# Patient Record
Sex: MALE | Race: WHITE
[De-identification: names, ages, dates, MRNs, and addresses within clinical notes are randomized per-mention and may not be internally consistent; named-entity substitution may affect disease eponyms.]

---

## 2018-01-19 ENCOUNTER — HOSPITAL ENCOUNTER (EMERGENCY)
Dept: HOSPITAL 63 - ER | Age: 38
Discharge: HOME | End: 2018-01-19
Payer: OTHER GOVERNMENT

## 2018-01-19 VITALS — WEIGHT: 180 LBS | HEIGHT: 74 IN | BODY MASS INDEX: 23.1 KG/M2

## 2018-01-19 VITALS — DIASTOLIC BLOOD PRESSURE: 93 MMHG | SYSTOLIC BLOOD PRESSURE: 129 MMHG

## 2018-01-19 DIAGNOSIS — B34.9: ICD-10-CM

## 2018-01-19 DIAGNOSIS — J02.9: Primary | ICD-10-CM

## 2018-01-19 LAB
INFLUENZA A PATIENT: NEGATIVE
INFLUENZA B PATIENT: NEGATIVE

## 2018-01-19 PROCEDURE — 87880 STREP A ASSAY W/OPTIC: CPT

## 2018-01-19 PROCEDURE — 87070 CULTURE OTHR SPECIMN AEROBIC: CPT

## 2018-01-19 PROCEDURE — 87804 INFLUENZA ASSAY W/OPTIC: CPT

## 2018-01-19 PROCEDURE — 99284 EMERGENCY DEPT VISIT MOD MDM: CPT

## 2018-01-19 NOTE — ED.ADGEN
Past History


Past Medical History:  Other





Adult General


Chief Complaint


Chief Complaint


" ... I ve got the crud...everyone here  for robyn has the crud.. "





HPI


HPI





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 

complaints of myalgia, arthralgia, malaise, fever, chills, pharyngitis, and 

fatigue.  Patient recently arrived in [place] from [place] for 

training. 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 

symptoms. Patient is up-to-date with vaccinations. No history 

immunosuppression. He will follow-up [facility].





Review of Systems


Review of Systems





Constitutional: History fever or chills []


Eyes: Denies change in visual acuity, redness, or eye pain []


HENT: History nasal congestion and sore throat []


Respiratory: History cough. Denies shortness of breath []


Cardiovascular: No additional information not addressed in HPI []


GI: Denies abdominal pain, nausea, vomiting, bloody stools or diarrhea []


: Denies dysuria or hematuria []


Musculoskeletal: Denies back pain or joint pain []


Integument: Denies rash or skin lesions []


Neurologic: Denies headache, focal weakness or sensory changes []


Endocrine: Denies polyuria or polydipsia []





All other systems were reviewed and found to be within normal limits, except as 

documented in this note.





Family History


Family History


Noncontributory





Current Medications


Current Medications





Current Medications








 Medications


  (Trade)  Dose


 Ordered  Sig/Julieta  Start Time


 Stop Time Status Last Admin


Dose Admin


 


 Ibuprofen


  (Motrin)  600 mg  STK-MED ONCE  1/19/18 02:11


 1/19/18 02:46 DC  


 


 


 Prednisone


  (Prednisone)  20 mg  STK-MED ONCE  1/19/18 02:11


 1/19/18 02:46 DC  


 





See nursing for home meds





Allergies


Allergies


No known drug allergies





Physical Exam


Physical Exam





Constitutional: Well developed, well nourished, mild distress, non-toxic 

appearance. []


HENT: Normocephalic, atraumatic, bilateral external ears normal, oropharynx 

moist, injected pharynx no oral exudates, nose swollen turbinates and rhinorrhea


Eyes: PERRLA, EOMI, conjunctiva normal, no discharge. [] 


Neck: Normal range of motion, no tenderness, supple, no stridor. [] 


Cardiovascular:Heart rate regular rhythm, no murmur []


Lungs & Thorax:  Bilateral breath sounds clear to auscultation []


Abdomen: Bowel sounds normal, soft, no tenderness, no masses, no pulsatile 

masses. [] 


Skin: Warm, dry, no erythema, no rash. [] 


Back: No tenderness, no CVA tenderness. [] 


Extremities: No tenderness, no cyanosis, no clubbing, ROM intact, no edema. [] 


Neurologic: Alert and oriented X 3, normal motor function, normal sensory 

function, no focal deficits noted. []


Psychologic: Affect normal, judgement normal, mood normal. []





Current Patient Data


Vital Signs





 Vital Signs








  Date Time  Temp Pulse Resp B/P (MAP) Pulse Ox O2 Delivery O2 Flow Rate FiO2


 


1/19/18 01:40 97.9 66 20  99 Room Air  








Lab Results





 Laboratory Tests








Test


  1/19/18


01:50


 


Influenza Type A (Rapid)


  Negative


(NEGATIVE)


 


Influenza Type B (Rapid)


  Negative


(NEGATIVE)


 


Group A Streptococcus Rapid


  Negative


(NEGATIVE)











EKG


EKG


[]





Radiology/Procedures


Radiology/Procedures


[]





Course & Med Decision Making


Course & Med Decision Making


Pertinent Labs and Imaging studies reviewed. (See chart for details).   Push 

fluids and fruit juices. Take Tylenol for pain, fever and discomfort.  Take over

-the-counter Benadryl 25-50 mg 4 times a day may be helpful. Follow-up primary 

care.





[]





Final Impression


Final Impression


1. Pharyngitis[]


2. Viral Syndrome


Problems:  





Dragon Disclaimer


Dragon Disclaimer


This electronic medical record was generated, in whole or in part, using a 

voice recognition dictation system.











DEMETRIA CANALES MD Jan 19, 2018 01:43